# Patient Record
Sex: MALE | Race: WHITE | NOT HISPANIC OR LATINO | URBAN - METROPOLITAN AREA
[De-identification: names, ages, dates, MRNs, and addresses within clinical notes are randomized per-mention and may not be internally consistent; named-entity substitution may affect disease eponyms.]

---

## 2018-01-12 ENCOUNTER — APPOINTMENT (RX ONLY)
Dept: URBAN - METROPOLITAN AREA CLINIC 128 | Facility: CLINIC | Age: 68
Setting detail: DERMATOLOGY
End: 2018-01-12

## 2018-01-12 DIAGNOSIS — L60.3 NAIL DYSTROPHY: ICD-10-CM

## 2018-01-12 DIAGNOSIS — D18.0 HEMANGIOMA: ICD-10-CM

## 2018-01-12 DIAGNOSIS — L28.1 PRURIGO NODULARIS: ICD-10-CM

## 2018-01-12 DIAGNOSIS — Z71.89 OTHER SPECIFIED COUNSELING: ICD-10-CM

## 2018-01-12 DIAGNOSIS — L81.4 OTHER MELANIN HYPERPIGMENTATION: ICD-10-CM

## 2018-01-12 DIAGNOSIS — L82.1 OTHER SEBORRHEIC KERATOSIS: ICD-10-CM

## 2018-01-12 DIAGNOSIS — D22 MELANOCYTIC NEVI: ICD-10-CM

## 2018-01-12 PROBLEM — D18.01 HEMANGIOMA OF SKIN AND SUBCUTANEOUS TISSUE: Status: ACTIVE | Noted: 2018-01-12

## 2018-01-12 PROBLEM — L55.1 SUNBURN OF SECOND DEGREE: Status: ACTIVE | Noted: 2018-01-12

## 2018-01-12 PROBLEM — Z85.46 PERSONAL HISTORY OF MALIGNANT NEOPLASM OF PROSTATE: Status: ACTIVE | Noted: 2018-01-12

## 2018-01-12 PROBLEM — D22.5 MELANOCYTIC NEVI OF TRUNK: Status: ACTIVE | Noted: 2018-01-12

## 2018-01-12 PROBLEM — L23.7 ALLERGIC CONTACT DERMATITIS DUE TO PLANTS, EXCEPT FOOD: Status: ACTIVE | Noted: 2018-01-12

## 2018-01-12 PROCEDURE — 99203 OFFICE O/P NEW LOW 30 MIN: CPT

## 2018-01-12 PROCEDURE — ? COUNSELING

## 2018-01-12 ASSESSMENT — LOCATION SIMPLE DESCRIPTION DERM
LOCATION SIMPLE: RIGHT CALF
LOCATION SIMPLE: RIGHT HAND
LOCATION SIMPLE: ABDOMEN
LOCATION SIMPLE: LEFT UPPER BACK
LOCATION SIMPLE: UPPER BACK
LOCATION SIMPLE: LEFT THUMB
LOCATION SIMPLE: RIGHT LOWER BACK

## 2018-01-12 ASSESSMENT — LOCATION DETAILED DESCRIPTION DERM
LOCATION DETAILED: INFERIOR THORACIC SPINE
LOCATION DETAILED: RIGHT DISTAL CALF
LOCATION DETAILED: LEFT THUMBNAIL
LOCATION DETAILED: RIGHT THUMBNAIL
LOCATION DETAILED: PERIUMBILICAL SKIN
LOCATION DETAILED: LEFT MID-UPPER BACK
LOCATION DETAILED: RIGHT SUPERIOR MEDIAL MIDBACK

## 2018-01-12 ASSESSMENT — LOCATION ZONE DERM
LOCATION ZONE: FINGERNAIL
LOCATION ZONE: LEG
LOCATION ZONE: TRUNK

## 2021-05-20 ENCOUNTER — TRANSFERRED RECORDS (OUTPATIENT)
Dept: HEALTH INFORMATION MANAGEMENT | Facility: CLINIC | Age: 71
End: 2021-05-20

## 2024-03-21 ENCOUNTER — TRANSFERRED RECORDS (OUTPATIENT)
Dept: HEALTH INFORMATION MANAGEMENT | Facility: CLINIC | Age: 74
End: 2024-03-21

## 2024-06-19 ENCOUNTER — TRANSFERRED RECORDS (OUTPATIENT)
Dept: HEALTH INFORMATION MANAGEMENT | Facility: CLINIC | Age: 74
End: 2024-06-19
Payer: COMMERCIAL

## 2024-07-08 ENCOUNTER — TRANSFERRED RECORDS (OUTPATIENT)
Dept: HEALTH INFORMATION MANAGEMENT | Facility: CLINIC | Age: 74
End: 2024-07-08
Payer: COMMERCIAL

## 2024-07-11 ENCOUNTER — MEDICAL CORRESPONDENCE (OUTPATIENT)
Dept: HEALTH INFORMATION MANAGEMENT | Facility: CLINIC | Age: 74
End: 2024-07-11
Payer: COMMERCIAL

## 2024-07-12 ENCOUNTER — TRANSCRIBE ORDERS (OUTPATIENT)
Dept: OTHER | Age: 74
End: 2024-07-12

## 2024-07-12 DIAGNOSIS — G61.81 CHRONIC INFLAMMATORY DEMYELINATING POLYNEURITIS (H): Primary | ICD-10-CM

## 2024-09-27 NOTE — TELEPHONE ENCOUNTER
Action 9/27/24 MV 2.24pm   Action Taken 1) called and spoke with pt to see where prev neuro recs are located. Pt stated he saw a neurologist Dr. Galeas in [place] a few yrs ago. Pt has those records. Obtained verbal consent from patient to access recs in CE. Pt will try to find EMG report. Pt will call me next week to see what recs he can find.  2) imaging request faxed to [facility]  Fax # [phone]  Tracking # [id number]  3) imaging request faxed to [facility]  Fax # [phone]  Tracking # [id number]  4) EMG request faxed to [facility]     Action 10/1/24 MV 11.41am   Action Taken Pt called back. He has labs and an old EMG test. He will bring everything to the appt     Action 10/2/24 MV 10.52am   Action Taken 2nd requests faxed for imaging and EMG     Action 10/3/24 MV 9.57am   Action Taken Imaging disk received from [facility] and brought to [facility]N for processing         RECORDS RECEIVED FROM: external   REASON FOR VISIT: Chronic inflammatory demyelinating polyneuritis    PROVIDER: Dr. Moulton    DATE OF APPT: 10/9/24   NOTES (FOR ALL VISITS) STATUS DETAILS   OFFICE NOTE from referring provider Received Deena DESAI @ [facility], [place]:  7/11/24   OFFICE NOTE from other specialist Care Everywhere Dr Ursula Peacock @ [facility]:  4/19/24    Taryn Meeks @ [facility], [place]:  6/17/21    Dr Patric Galeas @ [facility]:  6/4/21 5/24/21 5/20/21   EMG In [facility] [place]:  6/1/21   MEDICATION LIST Care Everywhere    IMAGING  (FOR ALL VISITS)     MRI (HEAD, NECK, SPINE) In [facility] [place]:  MRI Thoracic Spine 5/21/21  MRI Lumbar Spine 5/21/21  MRI Cervical Spine 5/20/21    [facility] [place]:  MRI Brain 5/14/21

## 2024-10-09 ENCOUNTER — PRE VISIT (OUTPATIENT)
Dept: NEUROLOGY | Facility: CLINIC | Age: 74
End: 2024-10-09

## 2024-11-11 ENCOUNTER — VIRTUAL VISIT (OUTPATIENT)
Dept: CONSULT | Facility: CLINIC | Age: 74
End: 2024-11-11
Attending: PSYCHIATRY & NEUROLOGY
Payer: COMMERCIAL

## 2024-11-11 ENCOUNTER — OFFICE VISIT (OUTPATIENT)
Dept: NEUROLOGY | Facility: CLINIC | Age: 74
End: 2024-11-11
Attending: PSYCHIATRY & NEUROLOGY
Payer: COMMERCIAL

## 2024-11-11 ENCOUNTER — LAB (OUTPATIENT)
Dept: LAB | Facility: CLINIC | Age: 74
End: 2024-11-11
Payer: COMMERCIAL

## 2024-11-11 ENCOUNTER — OFFICE VISIT (OUTPATIENT)
Dept: NEUROLOGY | Facility: CLINIC | Age: 74
End: 2024-11-11
Payer: COMMERCIAL

## 2024-11-11 VITALS
WEIGHT: 184 LBS | HEART RATE: 54 BPM | DIASTOLIC BLOOD PRESSURE: 82 MMHG | SYSTOLIC BLOOD PRESSURE: 161 MMHG | OXYGEN SATURATION: 99 %

## 2024-11-11 DIAGNOSIS — G61.81 CHRONIC INFLAMMATORY DEMYELINATING POLYNEURITIS (H): ICD-10-CM

## 2024-11-11 DIAGNOSIS — Z13.71 ENCOUNTER FOR NONPROCREATIVE GENETIC COUNSELING AND TESTING: Primary | ICD-10-CM

## 2024-11-11 DIAGNOSIS — G60.9 HEREDITARY AND IDIOPATHIC PERIPHERAL NEUROPATHY: Primary | ICD-10-CM

## 2024-11-11 DIAGNOSIS — Z71.83 ENCOUNTER FOR NONPROCREATIVE GENETIC COUNSELING AND TESTING: Primary | ICD-10-CM

## 2024-11-11 LAB
HOLD SPECIMEN: NORMAL
INTERPRETATION SERPL IEP-IMP: NORMAL
LAB PDF RESULT: NORMAL
LAB TEST RESULTS REPORTED IN RPTPERIOD: NORMAL
Lab: NORMAL
PERFORMING LABORATORY: NORMAL
SEQUENCING METHOD PNL BLD/T: NORMAL
SPECIMEN STATUS: NORMAL
SPECIMEN TYPE: NORMAL
TEST NAME: NORMAL

## 2024-11-11 PROCEDURE — 86334 IMMUNOFIX E-PHORESIS SERUM: CPT | Performed by: STUDENT IN AN ORGANIZED HEALTH CARE EDUCATION/TRAINING PROGRAM

## 2024-11-11 PROCEDURE — 95885 MUSC TST DONE W/NERV TST LIM: CPT | Performed by: PSYCHIATRY & NEUROLOGY

## 2024-11-11 PROCEDURE — 83520 IMMUNOASSAY QUANT NOS NONAB: CPT | Performed by: PATHOLOGY

## 2024-11-11 PROCEDURE — 83516 IMMUNOASSAY NONANTIBODY: CPT | Mod: 90 | Performed by: PATHOLOGY

## 2024-11-11 PROCEDURE — 96040 HC GENETIC COUNSELING, EACH 30 MINUTES: CPT | Mod: TEL,95 | Performed by: GENETIC COUNSELOR, MS

## 2024-11-11 PROCEDURE — 99000 SPECIMEN HANDLING OFFICE-LAB: CPT | Performed by: PATHOLOGY

## 2024-11-11 PROCEDURE — 95913 NRV CNDJ TEST 13/> STUDIES: CPT | Performed by: PSYCHIATRY & NEUROLOGY

## 2024-11-11 PROCEDURE — 86334 IMMUNOFIX E-PHORESIS SERUM: CPT | Mod: 26 | Performed by: STUDENT IN AN ORGANIZED HEALTH CARE EDUCATION/TRAINING PROGRAM

## 2024-11-11 PROCEDURE — 36415 COLL VENOUS BLD VENIPUNCTURE: CPT | Performed by: PATHOLOGY

## 2024-11-11 PROCEDURE — 36415 COLL VENOUS BLD VENIPUNCTURE: CPT | Performed by: GENETIC COUNSELOR, MS

## 2024-11-11 PROCEDURE — 99205 OFFICE O/P NEW HI 60 MIN: CPT | Mod: 25 | Performed by: PSYCHIATRY & NEUROLOGY

## 2024-11-11 RX ORDER — DICLOFENAC POTASSIUM 50 MG/1
TABLET, FILM COATED ORAL
COMMUNITY
Start: 2023-01-09

## 2024-11-11 RX ORDER — ESCITALOPRAM OXALATE 5 MG/1
TABLET ORAL
COMMUNITY
Start: 2024-10-27

## 2024-11-11 RX ORDER — ATORVASTATIN CALCIUM 10 MG/1
10 TABLET, FILM COATED ORAL
COMMUNITY
Start: 2023-01-10

## 2024-11-11 RX ORDER — OXYCODONE AND ACETAMINOPHEN 10; 325 MG/1; MG/1
1 TABLET ORAL DAILY PRN
COMMUNITY

## 2024-11-11 RX ORDER — FLECAINIDE ACETATE 50 MG/1
TABLET ORAL
COMMUNITY

## 2024-11-11 RX ORDER — LEVOTHYROXINE SODIUM 100 UG/1
TABLET ORAL
COMMUNITY
Start: 2023-01-28

## 2024-11-11 RX ORDER — ROPINIROLE 2 MG/1
2 TABLET, FILM COATED ORAL
COMMUNITY

## 2024-11-11 RX ORDER — METOPROLOL SUCCINATE 25 MG/1
TABLET, EXTENDED RELEASE ORAL
COMMUNITY
Start: 2023-06-27

## 2024-11-11 RX ORDER — PANTOPRAZOLE SODIUM 40 MG/1
TABLET, DELAYED RELEASE ORAL
COMMUNITY
Start: 2024-10-19

## 2024-11-11 RX ORDER — MULTIVITAMIN
1 TABLET ORAL
COMMUNITY

## 2024-11-11 ASSESSMENT — PAIN SCALES - GENERAL: PAINLEVEL_OUTOF10: MODERATE PAIN (4)

## 2024-11-11 NOTE — PROGRESS NOTES
[facility]  Electrodiagnostic Laboratory                 Department of Neurology                                                                                                         Test Date:  2024    Patient: Jonathan Serrano : 1950 Physician: Justin Moulton MD   Sex: Male AGE: 74 year Ref Phys: Justin Moulton MD   ID#: [id number]   Technician: Kristy Behling     History and Examination:  74 year old with numbness and weakness in distal lower > upper limb. He carries a diagnosis of CIDP. This study is being performed to investigate for radiculopathy vs focal neuropathy.     Techniques:  Motor and sensory conduction studies were done with surface recording electrodes. EMG was done with a concentric needle electrode.     Results:  Nerve conduction studies:  1. Right ulnar-D5, right median-D2, and bilateral sural sensory responses are absent.  2. Right radial sensroy response shows severely reduced amplitude.   3. Left peroneal-EDB motor response is absent.   4. Right peroneal-EDB motor response shows normal DL, severely reduced amplitude and mildly slowed CV.   5. Bilateral peroneal-TA motor responses show normal CV and reduced amplitudes.  6. Right tibial-AH motor response shows normal DL, severely reduced amplitude and mildly slowed CV.   7. Right median-APB motor response shows normal DL, moderately reduced amplitude and moderately slowed CV.   8. Right ulnar-ADM motor response shows mildly prolonged DL, normal amplitude and mildly slowed CV.     Needle EM. Fibrillation potentials and positive sharp waves were seen in the right gastrocnemius muscle.   2. Large amplitude and long duration motor unit potentials (MUP) were seen in the right TA and gastrocnemius muscles.   3. Rapidly firing MUPs with reduced recruitment were seen in the right TA and gastrocnemius muscles.     Interpretation:  This is an abnormal study. There is electrophysiologic evidence of a  severe sensorimotor polyneuropathy. Although the features are predominantly axonal and the pattern is predominately length-dependant, the presence of proximal equivocal slowing in upper limb motor nerves raises the possibility of a generalized dysmyelinating process.  Clinical correlation is recommended.     Justin Moulton MD  Department of Neurology    Nerve Conduction Studies  Motor Sites      Latency Neg. Amp Neg. Amp Diff Segment Distance Velocity Neg. Dur Neg Area Diff Temperature Comment   Site (ms) Norm (mV) Norm (%)  cm m/s Norm (ms) (%) ( C)    Left Dp Branch Fibular (TA) Motor   Fibular Head 5.1  < 6.0 1.15 -      7.2  31.8    Pop Fossa 7.2  < 5.7 0.59 - -49 Pop Fossa-Fibular Head 9 43 - 10.7 -30 31.8    Right Dp Branch Fibular (TA) Motor   Fibular Head 4.1  < 6.0 1.62 -      6.6  32.1    Pop Fossa 6.3  < 5.7 1.68 - 4 Pop Fossa-Fibular Head 10 45 - 6.7 0 32.1    Left Fibular (EDB) Motor   Ankle NR  < 6.0 NR -  Ankle-EDB 8   NR  31.8    Right Fibular (EDB) Motor   Ankle 4.5  < 6.0 0.18 -  Ankle-EDB 8   5.0  31.7    Bel Fibular Head 16.4 - 0.14 - -22 Bel Fibular Head-Ankle 39 33  > 38 5.9 -27 31.9    Pop Fossa 19.1 - 0.14 - 0 Pop Fossa-Bel Fibular Head 9 33  > 38 6.2 0 32    Right Median (APB) Motor   Wrist 3.9  < 4.4 2.4  > 5.0  Wrist-APB 8   5.3  29.3    Elbow 10.2 - 2.0  > 5.0 -17 Elbow-Wrist 24 38  > 48 5.4 -7 29.6    Arm 12.8 - 2.1  > 5.0 5 Arm-Elbow 10 38 - 5.8 7 29.6    Right Tibial (AHB) Motor   Ankle 4.1  < 6.5 0.52  > 5.0  Ankle-AH 8   9.2  32.3    Knee 16.1 - 0.20 - -62 Knee-Ankle 40 33  > 38 7.0 -84 32.3    Right Ulnar (ADM) Motor   Wrist 3.9  < 3.5 5.6  > 5.0  Wrist-ADM 8   5.4  33.4    Below Elbow 9.2 - 4.7 - -16 Below Elbow-Wrist 23 43  > 48 5.6 -6 33.4    Above Elbow 11.4 - 4.6 - -2 Above Elbow-Below Elbow 10 45  > 48 5.6 -3 33.4    Upper Arm 13.0 - 4.5 - -2 Upper Arm-Above Elbow 8 50 - 5.3 -13 33.5    Right Ulnar (FDI) Motor   Wrist 4.7 - 5.4 -      4.4  33    Below Elbow 9.9 - 5.3 - -2 Below  Elbow-Wrist 22 42  > 48 4.6 -7 33.2    Above Elbow 12.0 - 5.5 - 4 Above Elbow-Below Elbow 10 48  > 48 4.6 -2 33.2    Upper Arm 13.9 - 4.9 - -11 Upper Arm-Above Elbow 9 47 - 4.7 -6 33.2      Sensory Sites      Onset Lat Peak Lat Amp (O-P) Amp (P-P) Segment Distance Velocity Temperature Comment   Site ms (ms)  V Norm ( V)  cm m/s Norm ( C)    Right Median Sensory   Wrist-Dig II NR NR NR  > 10 NR Wrist-Dig II 14 NR  > 48 33.4    Right Radial Sensory   Forearm-Wrist 2.3 3.2 2  > 15 6 Forearm-Wrist 10 43 - 33.4    Left Sural Sensory   Calf-Lat Malleolus NR NR NR  > 5 NR Calf-Lat Malleolus 14 NR  > 38 32.1    Right Sural Sensory   Calf-Lat Malleolus NR NR NR  > 5 NR Calf-Lat Malleolus 14 NR  > 38 31.3    Right Ulnar Sensory   Wrist-Dig V NR NR NR  > 8 NR Wrist-Dig V 12.5 NR  > 48 33.5      F Wave Studies     Min-F Max-F Dispersion Persistence Mean-F F-Norm L-R Mean-F L-R Mean-F Norm F/M Ratio F-M Lat (ms)   Right Median (Abd Poll Brev)  33.5  C   39.22 40.70 1.48 100.00 40.36 <33  <2.2 2.06 34.69   Right Tibial (Abd Hallucis)  32.3  C      0.00  <61  <5.7         Electromyography     Side Muscle Ins Act Fibs/PSW Fasc HF Amp Dur Poly Recrt Int Pat   Right Vastus lat Nml None Nml 0 Nml Nml 0 Nml Nml   Right Tib ant Nml None Nml 0 2+ 1+ 3+ ModRed Nml   Right Gastroc Incr 2+ Nml 0 2+ 1+ 2+ ModRed Nml         NCS Waveforms:    Motor                           Sensory                       Ultrasound Images:

## 2024-11-11 NOTE — LETTER
2024       RE: Jonathan Serrano  [address] [place] [address]     Dear Colleague,    Thank you for referring your patient, Jonathan Serrano, to the [facility] EMG [facility]INIC [place] at [facility]. Please see a copy of my visit note below.                        [facility]  Electrodiagnostic Laboratory                 Department of Neurology                                                                                                         Test Date:  2024    Patient: Jonathan Serrano : 1950 Physician: Justin Moulton MD   Sex: Male AGE: 74 year Ref Phys: Justin Moulton MD   ID#: [id number]   Technician: Kristy Behling     History and Examination:  74 year old with numbness and weakness in distal lower > upper limb. He carries a diagnosis of CIDP. This study is being performed to investigate for radiculopathy vs focal neuropathy.     Techniques:  Motor and sensory conduction studies were done with surface recording electrodes. EMG was done with a concentric needle electrode.     Results:  Nerve conduction studies:  1. Right ulnar-D5, right median-D2, and bilateral sural sensory responses are absent.  2. Right radial sensroy response shows severely reduced amplitude.   3. Left peroneal-EDB motor response is absent.   4. Right peroneal-EDB motor response shows normal DL, severely reduced amplitude and mildly slowed CV.   5. Bilateral peroneal-TA motor responses show normal CV and reduced amplitudes.  6. Right tibial-AH motor response shows normal DL, severely reduced amplitude and mildly slowed CV.   7. Right median-APB motor response shows normal DL, moderately reduced amplitude and moderately slowed CV.   8. Right ulnar-ADM motor response shows mildly prolonged DL, normal amplitude and mildly slowed CV.     Needle EM. Fibrillation potentials and positive sharp waves were seen in the right gastrocnemius muscle.   2. Large  amplitude and long duration motor unit potentials (MUP) were seen in the right TA and gastrocnemius muscles.   3. Rapidly firing MUPs with reduced recruitment were seen in the right TA and gastrocnemius muscles.     Interpretation:  This is an abnormal study. There is electrophysiologic evidence of a severe sensorimotor polyneuropathy. Although the features are predominantly axonal and the pattern is predominately length-dependant, the presence of proximal equivocal slowing in upper limb motor nerves raises the possibility of a generalized dysmyelinating process.  Clinical correlation is recommended.     Justin Moulton MD  Department of Neurology    Nerve Conduction Studies  Motor Sites      Latency Neg. Amp Neg. Amp Diff Segment Distance Velocity Neg. Dur Neg Area Diff Temperature Comment   Site (ms) Norm (mV) Norm (%)  cm m/s Norm (ms) (%) ( C)    Left Dp Branch Fibular (TA) Motor   Fibular Head 5.1  < 6.0 1.15 -      7.2  31.8    Pop Fossa 7.2  < 5.7 0.59 - -49 Pop Fossa-Fibular Head 9 43 - 10.7 -30 31.8    Right Dp Branch Fibular (TA) Motor   Fibular Head 4.1  < 6.0 1.62 -      6.6  32.1    Pop Fossa 6.3  < 5.7 1.68 - 4 Pop Fossa-Fibular Head 10 45 - 6.7 0 32.1    Left Fibular (EDB) Motor   Ankle NR  < 6.0 NR -  Ankle-EDB 8   NR  31.8    Right Fibular (EDB) Motor   Ankle 4.5  < 6.0 0.18 -  Ankle-EDB 8   5.0  31.7    Bel Fibular Head 16.4 - 0.14 - -22 Bel Fibular Head-Ankle 39 33  > 38 5.9 -27 31.9    Pop Fossa 19.1 - 0.14 - 0 Pop Fossa-Bel Fibular Head 9 33  > 38 6.2 0 32    Right Median (APB) Motor   Wrist 3.9  < 4.4 2.4  > 5.0  Wrist-APB 8   5.3  29.3    Elbow 10.2 - 2.0  > 5.0 -17 Elbow-Wrist 24 38  > 48 5.4 -7 29.6    Arm 12.8 - 2.1  > 5.0 5 Arm-Elbow 10 38 - 5.8 7 29.6    Right Tibial (AHB) Motor   Ankle 4.1  < 6.5 0.52  > 5.0  Ankle-AH 8   9.2  32.3    Knee 16.1 - 0.20 - -62 Knee-Ankle 40 33  > 38 7.0 -84 32.3    Right Ulnar (ADM) Motor   Wrist 3.9  < 3.5 5.6  > 5.0  Wrist-ADM 8   5.4  33.4    Below Elbow  9.2 - 4.7 - -16 Below Elbow-Wrist 23 43  > 48 5.6 -6 33.4    Above Elbow 11.4 - 4.6 - -2 Above Elbow-Below Elbow 10 45  > 48 5.6 -3 33.4    Upper Arm 13.0 - 4.5 - -2 Upper Arm-Above Elbow 8 50 - 5.3 -13 33.5    Right Ulnar (FDI) Motor   Wrist 4.7 - 5.4 -      4.4  33    Below Elbow 9.9 - 5.3 - -2 Below Elbow-Wrist 22 42  > 48 4.6 -7 33.2    Above Elbow 12.0 - 5.5 - 4 Above Elbow-Below Elbow 10 48  > 48 4.6 -2 33.2    Upper Arm 13.9 - 4.9 - -11 Upper Arm-Above Elbow 9 47 - 4.7 -6 33.2      Sensory Sites      Onset Lat Peak Lat Amp (O-P) Amp (P-P) Segment Distance Velocity Temperature Comment   Site ms (ms)  V Norm ( V)  cm m/s Norm ( C)    Right Median Sensory   Wrist-Dig II NR NR NR  > 10 NR Wrist-Dig II 14 NR  > 48 33.4    Right Radial Sensory   Forearm-Wrist 2.3 3.2 2  > 15 6 Forearm-Wrist 10 43 - 33.4    Left Sural Sensory   Calf-Lat Malleolus NR NR NR  > 5 NR Calf-Lat Malleolus 14 NR  > 38 32.1    Right Sural Sensory   Calf-Lat Malleolus NR NR NR  > 5 NR Calf-Lat Malleolus 14 NR  > 38 31.3    Right Ulnar Sensory   Wrist-Dig V NR NR NR  > 8 NR Wrist-Dig V 12.5 NR  > 48 33.5      F Wave Studies     Min-F Max-F Dispersion Persistence Mean-F F-Norm L-R Mean-F L-R Mean-F Norm F/M Ratio F-M Lat (ms)   Right Median (Abd Poll Brev)  33.5  C   39.22 40.70 1.48 100.00 40.36 <33  <2.2 2.06 34.69   Right Tibial (Abd Hallucis)  32.3  C      0.00  <61  <5.7         Electromyography     Side Muscle Ins Act Fibs/PSW Fasc HF Amp Dur Poly Recrt Int Pat   Right Vastus lat Nml None Nml 0 Nml Nml 0 Nml Nml   Right Tib ant Nml None Nml 0 2+ 1+ 3+ ModRed Nml   Right Gastroc Incr 2+ Nml 0 2+ 1+ 2+ ModRed Nml         NCS Waveforms:    Motor                           Sensory                       Ultrasound Images:                Again, thank you for allowing me to participate in the care of your patient.      Sincerely,    Justin Moulton MD

## 2024-11-11 NOTE — PROGRESS NOTES
Chief Complaint: neuropathy    History of Present Illness:    Jonathan Serrano is a 74 year old man who I am seeing for neuropathy evaluation. He carries a diagnosis of CIDP.  He has previously been seen at [place] by Dr. Galeas. The last visit at [place] was 2021. He currently lives in [place], but would like to establish care with me in [place].    Onset of hie neuropathic symptoms was about 35 years ago when he was in his 40's. The first symptom was numbness and paresthesias. It was uncomfortable but not painful. The first symptom was in the left foot, but the right was pretty quickly affected after that. Over the years the sensory symptoms slow progressed to now affected him in the lower limbs below the distal 1/3rd of the legs. In the last year or 2 he has noticed similar abnormalities in the fingers. Both hands and all fingers are equally affected. This has made it more difficult to do things that require fine motor skills. Balance for most of the years has been ok, however the last couple years he has struggled. He has taken several falls over the last year. He uses no gait support devices and has not had an PT. He has a hand tremor in both hands. This has been present since he was 28 or 38 years of age. He also reports restless legs for which he takes ropinirole. This medication has been very helpful.     He has not developed any weakness. He gets cramps in his calves at night on occasion, usually after a lot of activity. No fasciculations. No dysarthria, diplopia or ptosis. He has had some swallow difficulty over the last couple years. He has seen GI for this. He does not choke, but feels that food does not pass down. No chronic cough. Appetite is good and weight is relatively stable. He denies breathing difficulties or shortness of breath while lying flat.    He saw Dr. Galeas in 2021. Notable data included CSF which had a protein of 65. NCS showed small motor responses in the lower limbs and normal in the upper  limbs. CV mildly slowed in both upper and lower limb motor nerves. Sensory responses small in the upper limbs and absent lower limbs. He had some genetic testing performed from [employer] [facility] but those results do not seem to be available. On the basis of these findings he was told he has CIDP and IVIG was recommended. He has not had any IVIG.     Prior pertinent laboratory work-up:  5/24/21: CSF protein 65, glucose 54, RBC 2, WBC 2  5/21: VEGF 106. . Normal/negative serum IF (no monoclonal protein), ESR, C3, C4, GM1, Gd1b, Gq1b    Prior pertinent radiology work-up:  5/14/21: CT chest showed no explanations for the neurologic symptoms  5/21/24: MRI C/T/LS spine showed scattered degenerative changes. In the LS spine there is moderate to severe central canal stenosis at L4-5 due to protrusion and severe facet arthrosis. There is severe right L4 foraminal narrowing.    Prior electrophysiologic work-up:  6/12/18: NCS in [place]. Peroneal NR. Sural absent.   5/20/21: Nerve conduction studies/EMG performed by Dr. Galeas at [place]. Motor responses in the lower limbs very small with mildly slowed CV. Upper limb motor responses normal amplitudes but CV mildly slowed. SNAPS absent lower limbs and small upper limbs in a length-dependant pattern.     Past Medical History:   Prostate cancer (surgical treatment)  Cholesterol  Back pain  RLS  Neuropathy    Past Surgical History:  Prostectomy  Thyroidectomy    Family history:    There is no known family history of hereditary neuropathies or other neuromuscular disorders.    Social History:    Occasional alcohol. He denies tobacco or illicit drug use.    Medical Allergies:  No Known Allergies     Current Medications:    Current Outpatient Medications   Medication Sig Dispense Refill    atorvastatin (LIPITOR) 10 MG tablet 10 mg.      diclofenac (CATAFLAM) 50 MG tablet Ran out      escitalopram (LEXAPRO) 5 MG tablet TAKE 1 TABLET DAILY AS     DIRECTED      levothyroxine  (SYNTHROID/LEVOTHROID) 100 MCG tablet Take 1 tablet every day by oral route for 90 days.      metoprolol succinate ER (TOPROL XL) 25 MG 24 hr tablet take 1/2 tab po daily.      pantoprazole (PROTONIX) 40 MG EC tablet TAKE 1 CAPSULE BY MOUTH ONCE A DAY BEFORE A MEAL AS DIRECTED      flecainide (TAMBOCOR) 50 MG tablet TAKE 1 TABLET (50 MG) BY MOUTH IN THE MORNING AND AT BEDTIME.      multivitamin w/minerals (MULTI-VITAMIN) tablet Take 1 tablet by mouth.      oxyCODONE-acetaminophen (PERCOCET)  MG per tablet Take 1 tablet by mouth daily as needed for pain.      rOPINIRole (REQUIP) 2 MG tablet Take 2 mg by mouth.       No current facility-administered medications for this visit.       Review of Systems: A complete review of systems was obtained and was negative except for what was noted above.    Physical examination:    BP (!) 161/82 (BP Location: Right arm, Patient Position: Sitting, Cuff Size: Adult Regular)   Pulse 54   Wt 83.5 kg (184 lb)   SpO2 99%      General Appearance: NAD    Skin: There are no rashes or other skin lesions.    Musculoskeletal:  There is no scoliosis, lordosis, kyphosis, pes cavus, or hammertoes.    Neurologic examination:    Mental status:  Patient is alert, attentive, and oriented x 3.  Language is coherent and fluent without aphasia.  Memory, comprehension and ability to follow commands were intact.       Cranial nerves:  Pupils were round and reacted to light.  Extraocular movements were full. There was no face, jaw, palate or tongue weakness or atrophy. Hearing was grossly intact.  Shoulder shrug was normal.       Motor exam: No atrophy or fasciculations.  Manual muscle testing revealed the following MRC grade muscle power:   Right Left   Neck flexion 5    Neck extension: 5    Shoulder abduction:  5 5   Elbow extension: 5 5   Elbow flexion:  5 5   Wrist flexion:  5 5   Wrist extension:  5 5   Finger extension 5 5   APB 4+ 4+   FDI 4 4   Hip flexion 5 5   Knee flexion 5 5   Knee  extension 5 5   Dorsiflexion 4 4   Plantar flexion 5 5     Complex motor skills: Moderate postural hand tremor. No ataxia    Sensory exam: Pin reduced below mid leg. Vibration absent toes and ankles. Reduced finger tips.     Gait: Narrow but steppage    Deep tendon reflexes:   Right Left   Triceps 1 1   Biceps 1 1   Brachioradialis 1 1   Knee jerk 0 0   Ankle jerk 0 0   Plantar responses were flexor bilaterally.       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 Electrically the findings are axonal (my review of the NCS show no unequivocal demyelinating features). Clinically his pattern is distal with both motor and sensory abnormalities. Curious associated features which may be related include tremor and dysphagia (but no cough). This collection of findings raises highest concern for a genetically determined neuropathy. He has some (but certainly not all) features of CANVAS syndrome. SORD neuropathy is also on the differential, especially considering potential treatments. For these reasons I would like to see if he can obtain a broad panel of genetic testing for CMT that includes RFC1 and SORD testing. Also on the differential diagnosis is acquired immune neuropathies. Considering the progression and duration of symptoms I think this is less likely, but difficult to exclude. Anti-MAG neuropathy may be an exception. I patricia like to obtain NCS and testing for monoclonal proteins and MAG today. Regarding treatment, he has been offered IVIG in the past but has elected not to proceed with it due to safety concerns. He asks about vyvgart today, which  has recently been approved for CIDP. Considering the diagnostic concerns as above I have not recommended immunotherapy at this time. I would like to see him back in a few months. If the genetic testing is negative, the NCS show evidence of demyelination and his symptoms worsen then will reconsider the role of immunotherapy.     Plan:      1. Labs: NFL, serum IF, MAG  2. NCS/EMG: Will obtain today to look for evidence of demyelination  3. Genetic referral for CMT testing, ideally including RFC1 and SORD. Will track down [facility] report from 2021, and if confirmed not repeat that testing. Instead will focus on SORD and RFC1 testing.   4. Immunotherapy: No indication at this time, but will reconsider pending above  5. Supportive care:   - Balance: Encouraged referral to PT. He lives in [place], and so I advised him to get the referral from his PCP  - Symptomatic management of pain and paresthesias: None  6. Disclosures discussed and in AVS  7. Follow up in 3 months. Him lives in [place], but would like to return to [place] for follow up.   ----    ADDENDUM: NCS/EMG showed very small lower limb motor responses. CV was in mid 30's In upper limbs, ulnar and median amplitudes moderately reduced with slow to upper 30's. Snaps absent. Compared to the study from Dr. Galeas in 2021 the CV slowing is essentially the same but amplitudes are smaller.     ADDENDUM: Labs showed normal NFL (20.2), serum IF (no monoclonal protein), negative MAG. [facility] Genetics testing pending.

## 2024-11-11 NOTE — NURSING NOTE
Chief Complaint   Patient presents with    Consult     NEW SUBSPECIALTY NEUROPATHY - OK Per Sherron RN G61.81 (ICD-10-CM) - Chronic inflammatory demyelinating polyneuritis (H)     Светлана Doe, EMT

## 2024-11-11 NOTE — Clinical Note
11/11/2024      RE: Jonathan Serrano  [address] [place] [address] [place] [address]     Dear Colleague,    Thank you for the opportunity to participate in the care of your patient, Jonathan Serrano, at the [facility] at [facility]. Please see a copy of my visit note below.    No notes on file    Please do not hesitate to contact me if you have any questions/concerns.     Sincerely,       Sabina Anne, GC

## 2024-11-11 NOTE — PATIENT INSTRUCTIONS
I earn consulting income from [employer], [employer], and [employer]. These companies manufacture some of the products that are used to treat CIDP, ut none that I am recommending for you. I would be happy to respond to any concerns or questions you may have.

## 2024-11-11 NOTE — LETTER
11/11/2024       RE: Jonathan Serrano  [address] [place] [address] [place] [address]     Dear Colleague,    Thank you for referring your patient, Jonathan Serrano, to the [facility]UROLOG[facility]. Please see a copy of my visit note below.    Chief Complaint: neuropathy    History of Present Illness:    Jonathan Serrano is a 74 year old man who I am seeing for neuropathy evaluation. He carries a diagnosis of CIDP.  He has previously been seen at [facility] by Dr. Galeas. The last visit at [facility] was 2021. He currently lives in [place], but would like to establish care with me in [place].    Onset of hie neuropathic symptoms was about 35 years ago when he was in his 40's. The first symptom was numbness and paresthesias. It was uncomfortable but not painful. The first symptom was in the left foot, but the right was pretty quickly affected after that. Over the years the sensory symptoms slow progressed to now affected him in the lower limbs below the distal 1/3rd of the legs. In the last year or 2 he has noticed similar abnormalities in the fingers. Both hands and all fingers are equally affected. This has made it more difficult to do things that require fine motor skills. Balance for most of the years has been ok, however the last couple years he has struggled. He has taken several falls over the last year. He uses no gait support devices and has not had an PT. He has a hand tremor in both hands. This has been present since he was 28 or 38 years of age. He also reports restless legs for which he takes ropinirole. This medication has been very helpful.     He has not developed any weakness. He gets cramps in his calves at night on occasion, usually after a lot of activity. No fasciculations. No dysarthria, diplopia or ptosis. He has had some swallow difficulty over the last couple years. He has seen GI for this. He does not choke, but feels that food does  not pass down. No chronic cough. Appetite is good and weight is relatively stable. He denies breathing difficulties or shortness of breath while lying flat.    He saw Dr. Galeas in 2021. Notable data included CSF which had a protein of 65. NCS showed small motor responses in the lower limbs and normal in the upper limbs. CV mildly slowed in both upper and lower limb motor nerves. Sensory responses small in the upper limbs and absent lower limbs. He had some genetic testing performed from [facility], but those results do not seem to be available. On the basis of these findings he was told he has CIDP and IVIG was recommended. He has not had any IVIG.     Prior pertinent laboratory work-up:  5/24/21: CSF protein 65, glucose 54, RBC 2, WBC 2  5/21: VEGF 106. . Normal/negative serum IF (no monoclonal protein), ESR, C3, C4, GM1, Gd1b, Gq1b    Prior pertinent radiology work-up:  5/14/21: CT chest showed no explanations for the neurologic symptoms  5/21/24: MRI C/T/LS spine showed scattered degenerative changes. In the LS spine there is moderate to severe central canal stenosis at L4-5 due to protrusion and severe facet arthrosis. There is severe right L4 foraminal narrowing.    Prior electrophysiologic work-up:  6/12/18: NCS in [place]. Peroneal NR. Sural absent.   5/20/21: Nerve conduction studies/EMG performed by Dr. Galeas at [place]. Motor responses in the lower limbs very small with mildly slowed CV. Upper limb motor responses normal amplitudes but CV mildly slowed. SNAPS absent lower limbs and small upper limbs in a length-dependant pattern.     Past Medical History:   Prostate cancer (surgical treatment)  Cholesterol  Back pain  RLS  Neuropathy    Past Surgical History:  Prostectomy  Thyroidectomy    Family history:    There is no known family history of hereditary neuropathies or other neuromuscular disorders.    Social History:    Occasional alcohol. He denies tobacco or illicit drug use.    Medical  Allergies:  No Known Allergies     Current Medications:    Current Outpatient Medications   Medication Sig Dispense Refill     atorvastatin (LIPITOR) 10 MG tablet 10 mg.       diclofenac (CATAFLAM) 50 MG tablet Ran out       escitalopram (LEXAPRO) 5 MG tablet TAKE 1 TABLET DAILY AS     DIRECTED       levothyroxine (SYNTHROID/LEVOTHROID) 100 MCG tablet Take 1 tablet every day by oral route for 90 days.       metoprolol succinate ER (TOPROL XL) 25 MG 24 hr tablet take 1/2 tab po daily.       pantoprazole (PROTONIX) 40 MG EC tablet TAKE 1 CAPSULE BY MOUTH ONCE A DAY BEFORE A MEAL AS DIRECTED       flecainide (TAMBOCOR) 50 MG tablet TAKE 1 TABLET (50 MG) BY MOUTH IN THE MORNING AND AT BEDTIME.       multivitamin w/minerals (MULTI-VITAMIN) tablet Take 1 tablet by mouth.       oxyCODONE-acetaminophen (PERCOCET)  MG per tablet Take 1 tablet by mouth daily as needed for pain.       rOPINIRole (REQUIP) 2 MG tablet Take 2 mg by mouth.       No current facility-administered medications for this visit.       Review of Systems: A complete review of systems was obtained and was negative except for what was noted above.    Physical examination:    BP (!) 161/82 (BP Location: Right arm, Patient Position: Sitting, Cuff Size: Adult Regular)   Pulse 54   Wt 83.5 kg (184 lb)   SpO2 99%      General Appearance: NAD    Skin: There are no rashes or other skin lesions.    Musculoskeletal:  There is no scoliosis, lordosis, kyphosis, pes cavus, or hammertoes.    Neurologic examination:    Mental status:  Patient is alert, attentive, and oriented x 3.  Language is coherent and fluent without aphasia.  Memory, comprehension and ability to follow commands were intact.       Cranial nerves:  Pupils were round and reacted to light.  Extraocular movements were full. There was no face, jaw, palate or tongue weakness or atrophy. Hearing was grossly intact.  Shoulder shrug was normal.       Motor exam: No atrophy or fasciculations.  Manual  muscle testing revealed the following MRC grade muscle power:   Right Left   Neck flexion 5    Neck extension: 5    Shoulder abduction:  5 5   Elbow extension: 5 5   Elbow flexion:  5 5   Wrist flexion:  5 5   Wrist extension:  5 5   Finger extension 5 5   APB 4+ 4+   FDI 4 4   Hip flexion 5 5   Knee flexion 5 5   Knee extension 5 5   Dorsiflexion 4 4   Plantar flexion 5 5     Complex motor skills: Moderate postural hand tremor. No ataxia    Sensory exam: Pin reduced below mid leg. Vibration absent toes and ankles. Reduced finger tips.     Gait: Narrow but steppage    Deep tendon reflexes:   Right Left   Triceps 1 1   Biceps 1 1   Brachioradialis 1 1   Knee jerk 0 0   Ankle jerk 0 0   Plantar responses were flexor bilaterally.       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 Electrically the findings are axonal (my review of the NCS show no unequivocal demyelinating features). Clinically his pattern is distal with both motor and sensory abnormalities. Curious associated features which may be related include tremor and dysphagia (but no cough). This collection of findings raises highest concern for a genetically determined neuropathy. He has some (but certainly not all) features of CANVAS syndrome. SORD neuropathy is also on the differential, especially considering potential treatments. For these reasons I would like to see if he can obtain a broad panel of genetic testing for CMT that includes RFC1 and SORD testing. Also on the differential diagnosis is acquired immune neuropathies. Considering the progression and duration of symptoms I think  this is less likely, but difficult to exclude. Anti-MAG neuropathy may be an exception. I patricia like to obtain NCS and testing for monoclonal proteins and MAG today. Regarding treatment, he has been offered IVIG in the past but has elected not to proceed with it due to safety concerns. He asks about vyvgart today, which has recently been approved for CIDP. Considering the diagnostic concerns as above I have not recommended immunotherapy at this time. I would like to see him back in a few months. If the genetic testing is negative, the NCS show evidence of demyelination and his symptoms worsen then will reconsider the role of immunotherapy.     Plan:      1. Labs: NFL, serum IF, MAG  2. NCS/EMG: Will obtain today to look for evidence of demyelination  3. Genetic referral for CMT testing, ideally including RFC1 and SORD  4. Immunotherapy: No indication at this time, but will reconsider pending above  5. Supportive care:   - Balance: Encouraged referral to PT. He lives in [place], and so I advised him to get the referral from his PCP  - Symptomatic management of pain and paresthesias: None  6. Disclosures discussed and in AVS  7. Follow up in 3 months. Him lives in [place], but would like to return to [place] for follow up.   ----        Again, thank you for allowing me to participate in the care of your patient.      Sincerely,    Justin Moulton MD

## 2024-11-12 LAB
LOCATION OF TASK: NORMAL
PROT PATTERN SERPL IFE-IMP: NORMAL

## 2024-11-13 LAB
MAG IGM SER IA-ACNC: <1000 TU
MAYO MISC RESULT: NORMAL
PERFORMING LABORATORY: NORMAL
SGPG IGM SER-ACNC: 0.11 IV
SPECIMEN STATUS: NORMAL
TEST NAME: NORMAL

## 2024-11-14 NOTE — PROGRESS NOTES
"[facility] PEDIATRIC SPECIALTY CLINIC  [address]  [facility] [address]  Phone: [phone]  Fax: [phone]    Patient:  Jonathan Serrano, Date of birth 1950  Date of Visit:  11/14/2024  Referring Provider Justin Moulton    Assessment & Plan    ***    Sabina Anne, MS [facility]  Genetic Counselor  Email: [email or web address]  Phone: [phone]    Total Time Spent in Consultation: Approximately ***  {Do not delete. Used for tracking note template use:998624}      ***Virtual Visit Details    Type of service:  Telephone Visit   Phone call duration: *** minutes   Originating Location (pt. Location): {patient location:839400::\"Home\"}  {PROVIDER LOCATION On-site should be selected for visits conducted from your clinic location or adjoining [facility] hospital, academic office, or other nearby [facility] building. Off-site should be selected for all other provider locations, including home:006[address]}  Distant Location (provider location):  {virtual location provider:415499}      Genetic Counseling Consultation Note    Presenting Information:  A consultation in the [facility] was requested for Jonathan, a 74 year old male, for evaluation of ***referring symptoms. This consultation was requested by ***referringdoc in ***referringdept at the patient's visit on ***dateofreferral.    Jonathan was accompanied to this visit conducted by {CKDLocation:483168} by their ***parent, ***parentname. ***Jonathan attended this visit conducted by {CKDLocation:918771} alone. ***A *** was used (*** ID number).     History is obtained from ***parent or self and the medical record. I met with the family at the request of {CKDGENETICIST:489709} to obtain a personal and family history, discuss possible genetic contributions to his symptoms, and to obtain informed consent for genetic testing.    ***familyexpectations  ***describe " "visit and set expectations, doctor will examine and gather more information to see if testing is indicated...  ***The familiy's goals for this visit include...    Personal History:   For additional details, review note on *** appt date from {CKDGENETICIST:610355}.  To summarize, Jonathan has a history of ***    There is no problem list on file for this patient.    No past medical history on file.    Neuro:   Psyche:  Optho:  ENT:  Dental:  Endo:  Cardio:  Respiratory:  GI:  :  MSK:  Derm:  Heme:  Pertinent Negatives:    Social History  ***  Father available for testing: {YES/NO:186390}  Mother available for testing: {YES/NO:040815}  Full sibling available for testing: {YES/NO:726802}   Half sibling available for testing: {YES/NO:849837}    Pregnancy/ History  Mother's age: *** years  Father's age: *** years  Jonathan was born at *** gestation via ***  ***Spontaneous OR assisted conception  Prenatal care ***was received.  Pregnancy complications included ***  Prenatal testing included ***  Prenatal exposure and acute maternal illness during pregnancy: ***  The APGAR scores were ***  Birth Weight: 0 lbs 0 oz  Birth Length: Data Unavailable\"  Birth Head Circumference: Data Unavailable\"  Complications in the  period included: ***    Relevant Imaging  ***    Previous Genetic Testing  ***    Family History:   A standard three generation pedigree was obtained and is scanned into the medical record.  History pertinent to referral is underlined.  Children: ***  Siblings: ***  Full siblings: ***  Paternal half siblings: ***  Maternal half siblings: ***  Paternal: ***  Paternal ancestry is of *** descent.   Father, Data Unavailable: ***  Paternal grandfather: ***  Paternal grandmother: ***  Paternal aunts/uncles: ***  Paternal cousins: ***  Maternal: ***  Maternal ancestry is of ***descent.  Mother, Data Unavailable:  ***  Maternal grandfather: ***  Maternal grandmother: ***  Maternal aunts/uncles: " ***  Maternal cousins: ***    There are no additional reports of family members with ***referring symptoms. ***Consanguinity is denied.     ***Interpretation    Genetic Counseling Discussion:  The potential results were discussed including a positive, negative, or variant of uncertain significance.    Necessity of Genetic Testing  Management and surveillance of Jonathan will depend on the genetic test results. It can also help predict the recurrence risk for Jonathan and other family members to have another child with similar healthcare needs.    ***Resources  [email or web address]  [email or web address]  Primarily for terminal disorders, palliative care focused  There are oftentimes Facebook groups or other online support communities. While these can be beneficial, we encourage individuals to use skepticism and critical thinking. Online resources can sometimes be overwhelming. Online support resources should complement, rather than replace clinical information.    SORD mutations lead to damage to the axon in two types of the nerves, the motor nerves (involved in muscle movement) and the sensory nerves (involved in sensation or feeling). Damage to motor nerves occurs in all individuals with SORD deficiency and causes muscle weakness and difficulty with muscle movement, especially in the hands and feet. This can lead to difficulty walking, writing, putting on jewelry and many other types of movement. Damage to the sensory nerves occurs in roughly half of individuals with SORD neuropathy and can cause numbness, tingling and impaired balance, which can lead to fatigue.    The symptoms of SORD neuropathy most often begin in the teenage years or twenties but can develop later in life. The symptoms of this condition and severity of these symptoms are variable. However, delayed motor milestones in infancy and childhood are uncommon. The majority of individuals with SORD neuropathy have muscle weakness in the distal limbs. Muscles closer to the center of the body (proximal muscles) are generally not affected. Additional symptoms may include scoliosis and/or hearing loss.     SORD neuropathy is inherited in an autosomal recessive manner. This means that to be affected an individual must have a mutation in both copies of the SORD gene.  Individuals with just one mutation in the SORD gene are said to be carriers.  Carriers do not have SORD-related neuropathy but can have an affected child if their partner is also a carrier.  When both parents are carrier,  there is a 25% chance for each child to be affected, a 50% chance to be a carrier, and a 25% chance to be unaffected and not a carrier.     There is currently no cure or treatment for SORD neuropathy. However, [employer] is running a clinical trial that is currently enrolling patients with SORD neuropathy under 54 y/o. If successful, a targeted treatment will be available to individuals diagnosed with this condition. SORD  genetic testing is necessary to determine eligibility for clinical trials and targeted treatment.    There are three types of results from this testing:  Negative: meaning no pathogenic variants were identified in the genes that were tested  A negative result does not rule out the possibility that Jonathan's symptoms are due to a genetic etiology  Positive: meaning one (or more) pathogenic variants were identified in the genes that were tested that are associated with Jonathan's symptoms  We discussed that a positive result could provide an etiology to Jonathan's symptoms, give anticipatory guidance as to their potential progression, and clarify risks to family members.  We also discussed that a positive result could indicate that Jonathan is at risks for other health concerns, outside of their presenting symptoms  Uncertain: meaning one (or more) variants were identified in the genes that were tested, but there is not enough data to know if this variant is disease-causing; these are called variants of uncertain significance (VUS)  In most cases, identification of a VUS does not confirm a diagnosis and does not result in any clinically actionable recommendations.  The variant will be monitored over time to see if more information is known about it in the future.  If a VUS is identified, testing of other relatives may be helpful to provide clarification.    We discussed the potential benefits of genetic testing and why this genetic testing is medically indicated. A positive result will help determine the etiology of neuropathy noted in Jonathan and could guide medical management for Jonathan. If a genetic cause is found for Jonathan, it will give us a more accurate risk assessment for other family members. Thus, the recommended testing for Jonathan  is DIAGNOSTIC testing, and it is NOT investigational.    2. CANVAS stands for some of the most common symptoms associated with the condition. CA stands for cerebellar ataxia or poor  coordination. N stands for neuropathy (primarily sensory) or nerve damage. VA stands for vestibular areflexia or lack of reflexes, and S stands for syndrome. Many individuals with CANAVS have a chronic cough and autonomic dysfunction (postural hypotension, erectile dysfunction, changes in sweating and dry mouth/eyes). It is important to note that variable expressivity is seen in CANVAS which means that not each individual with the condition will experience the same symptoms.      A recent paper found evidence of RFC1 expansions in ~34% of patients with chronic sensory neuropathy without other features typically associated with CANVAS syndrome such as autonomic dysfunction. The presence of cough, dysarthria and or dysphagia, history of falls, and progressive course were positive predictors for RFC1 expansions in the case of unexplained chronic sensory neuropathy.     Life expectancy is not impacted by CANVAS and onset of symptoms typically occurs after 34 y/o with average age of onset in 50s. Treatment of the condition is symptomatic, there is no cure for CANVAS. It is estimated that every 1/625 to 1/20,000 individuals has CANVAS.     CANVAS is a recessive genetic condition. This means that both copies of the RFC1 gene (the copy inherited from one s father and the copy inherited from one s mother) must have a pathogenic or disease-causing expansion. If someone is found to have a singular expansion in the gene, they are considered a carrier for CANVAS, but not at risk for symptoms themself. It s estimated that 0.7 to 4% of individuals of [place]  background are carriers for CANVAS.      If genetic test results are positive and indicate that Jonathan has CANVAS, it is assumed that Jonathan's parents were each carriers for the condition. This would mean that each of Jonathan's full siblings have a 1/4 or 25% chance of having CANVAS like Jonathan. There is a 1/2 or 50% chance that Jonathan's full siblings are carriers for  CANVAS syndrome.     If genetic test results are positive and indicate that Jonathan has CANVAS, each of their children would be carriers for the condition. Children could only be affected if their other parent is a carrier for the condition. Up to 4% of individuals of [place]  ancestry are carriers for CANVAS.     CANVAS is due to an abnormal repeat of base pairs in the RFC1 gene. Specifically, AAGGG repeats in the RFC1 gene are the most common cause of CANVAS syndrome. Genetic testing analyzes the presence of the abnormal base pair expansion and if it is present on 1 or both copies of the gene.     When performing genetic testing, there are several types of results   Negative: meaning the abnormal expansion was not detected   A negative result does not rule out the possibility that Jonathan's symptoms are due to a genetic etiology   Positive: meaning the abnormal AAGGG expansion was detected   It is possible for an abnormal AAGGG expansion to be detected on only 1 of Jonathan's RFC1 genes. This would indicate that Jonathan is a carrier for CANVAS, but not affected with the condition himself.   It is possible for an abnormal AAGGG expansion to be detected on both of Jonathan's RFC1 genes. This would provide an etiology to Jonathan's symptoms, give anticipatory guidance as to their potential progression, and clarify risks to family members.  We also discussed that a positive result could indicate that Jonathan is at risks for other health concerns, outside of their presenting symptoms   Uncertain: meaning that a variant was detected in Harrys RFC1 gene, but the laboratory is not certain that it can cause health concerns   In most cases, identification of uncertain variants does not confirm a diagnosis and does not result in any clinically actionable recommendations.  The variant will be monitored over time to see if more information is known about it in the future.  If a VUS is identified, testing of other relatives  "may be helpful to provide clarification.     We discussed the potential benefits of genetic testing and why this genetic testing is medically indicated. A positive result will help determine the etiology of neuropathy noted in Jonathan and would guide medical management for Jonathan.  If a genetic cause is found for Jonathan, it will give us a more accurate risk assessment for other family members.  Thus, the recommended testing for Jonathan is DIAGNOSTIC testing, and it is NOT investigational.     References:   Isi A, Ramos MM, Oscar H. RFC1 CANVAS / Spectrum Disorder. 2020 Nov 25. In: Dony MP, Livia HH, Matthias RA, et al., editors. GeneReviews  [Internet]. [place]): [facility], [place]; 2164-6769. Available from: https://www.ncbi.nlm.nih.gov/books/SER437810/     Bjorn Mills et al. \"RFC1 expansions are a common cause of idiopathic sensory neuropathy.\" Brain 144.5 (2021): 7256-9946.             "

## 2024-12-04 LAB — INTERPRETATION SERPL IEP-IMP: NORMAL

## 2024-12-15 ENCOUNTER — HEALTH MAINTENANCE LETTER (OUTPATIENT)
Age: 74
End: 2024-12-15

## 2025-01-21 ENCOUNTER — DOCUMENTATION ONLY (OUTPATIENT)
Dept: CONSULT | Facility: CLINIC | Age: 75
End: 2025-01-21
Payer: COMMERCIAL

## 2025-01-21 NOTE — PROGRESS NOTES
[facility] [employer](Faisal REF#  [id number])    CPT: 65819      No PA required.  $150 ded (none met), $3500 OOP (none met), 100% coverage w/ $25 copay after ded is met.    Patient eOOP = $175 (ded + copay)    Policy Link: N/A           Provider Needs to Review policy   No    Rahel Sinha MA

## 2025-02-12 ENCOUNTER — LAB (OUTPATIENT)
Dept: LAB | Facility: CLINIC | Age: 75
End: 2025-02-12
Payer: COMMERCIAL

## 2025-02-12 DIAGNOSIS — G60.9 HEREDITARY AND IDIOPATHIC PERIPHERAL NEUROPATHY: Primary | ICD-10-CM

## 2025-02-12 PROCEDURE — G0452 MOLECULAR PATHOLOGY INTERPR: HCPCS | Mod: 26 | Performed by: PATHOLOGY

## 2025-02-12 PROCEDURE — 81479 UNLISTED MOLECULAR PATHOLOGY: CPT | Performed by: PSYCHIATRY & NEUROLOGY

## 2025-02-20 ENCOUNTER — TELEPHONE (OUTPATIENT)
Dept: CONSULT | Facility: CLINIC | Age: 75
End: 2025-02-20
Payer: COMMERCIAL

## 2025-02-20 LAB
PERFORMING LABORATORY: NORMAL
SPECIMEN STATUS: NORMAL
TEST NAME: NORMAL

## 2025-02-20 NOTE — TELEPHONE ENCOUNTER
"I called Jonathan to discuss the results of genetic testing. Our initial consultation occurred on 11/11/2024 where informed consent was obtained for genetic testing.    The results of SORD next generation sequencing were negative/normal.     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    Previous Genetic Testing  Chart review references and [facility] panel for which \"no abnormalities noted that are of significance to the clinical syndrome.\" However, I am unable to review a copy of these results or discern what panel/genes were tested.    Family History:   A standard three generation pedigree was previously obtained. There is no family history of similar health concerns.    Assessment:  These results do not provide an alternative genetic etiology to Jonathan's health concerns. Genetic testing for CANVAS syndrome (RFC1) was recommended, but not pursued due to high costs.    Plan:  Results mailed to Jonathan for their records.    [facility]  Genetic Counselor  Email: [email or web address]  Phone: [phone]  "

## 2025-02-24 ENCOUNTER — TELEPHONE (OUTPATIENT)
Dept: NEUROLOGY | Facility: CLINIC | Age: 75
End: 2025-02-24
Payer: COMMERCIAL

## 2025-02-24 NOTE — TELEPHONE ENCOUNTER
Caller spoke with the pt to schedule a follow up appt with Dr. Moulton, pt stated they live in [place] and have many questions and would like to speak with a RN or the provider to see if a follow up appt is necessary.        Sherice Limon on 2/24/2025 at 12:44 PM

## 2025-02-27 NOTE — TELEPHONE ENCOUNTER
Spoke with Jonathan. He lives permanently in [place] so he doesn't want to make another trip to [place] to see Dr. Moulton if it's not necessary. He is under the impression that he doesn't have CIDP, so he wonders if Dr. Moulton has recommendations or a treatment plan for what he does have. If he doesn't, then he doesn't think a trip would be worth it.  Will notify Dr. Moulton.    Gay Milligan RN

## 2025-03-20 ENCOUNTER — MEDICAL CORRESPONDENCE (OUTPATIENT)
Dept: HEALTH INFORMATION MANAGEMENT | Facility: CLINIC | Age: 75
End: 2025-03-20
Payer: COMMERCIAL

## 2025-03-24 ENCOUNTER — TRANSCRIBE ORDERS (OUTPATIENT)
Dept: OTHER | Age: 75
End: 2025-03-24

## 2025-03-24 DIAGNOSIS — G61.81 CHRONIC INFLAMMATORY DEMYELINATING POLYNEURITIS (H): Primary | ICD-10-CM

## 2025-04-27 ENCOUNTER — HEALTH MAINTENANCE LETTER (OUTPATIENT)
Age: 75
End: 2025-04-27